# Patient Record
Sex: FEMALE | Race: WHITE | NOT HISPANIC OR LATINO
[De-identification: names, ages, dates, MRNs, and addresses within clinical notes are randomized per-mention and may not be internally consistent; named-entity substitution may affect disease eponyms.]

---

## 2017-03-23 ENCOUNTER — TRANSCRIPTION ENCOUNTER (OUTPATIENT)
Age: 27
End: 2017-03-23

## 2017-03-24 ENCOUNTER — INPATIENT (INPATIENT)
Facility: HOSPITAL | Age: 27
LOS: 0 days | Discharge: HOME | End: 2017-03-24
Attending: SURGERY | Admitting: SURGERY
Payer: COMMERCIAL

## 2017-03-24 VITALS
OXYGEN SATURATION: 100 % | DIASTOLIC BLOOD PRESSURE: 96 MMHG | RESPIRATION RATE: 18 BRPM | HEART RATE: 82 BPM | TEMPERATURE: 98 F | SYSTOLIC BLOOD PRESSURE: 147 MMHG

## 2017-03-24 VITALS
RESPIRATION RATE: 17 BRPM | HEART RATE: 77 BPM | DIASTOLIC BLOOD PRESSURE: 75 MMHG | SYSTOLIC BLOOD PRESSURE: 131 MMHG | OXYGEN SATURATION: 99 %

## 2017-03-24 DIAGNOSIS — K37 UNSPECIFIED APPENDICITIS: ICD-10-CM

## 2017-03-24 LAB
ALBUMIN SERPL ELPH-MCNC: 4 G/DL — SIGNIFICANT CHANGE UP (ref 3.3–5)
ALP SERPL-CCNC: 27 U/L — LOW (ref 40–120)
ALT FLD-CCNC: 12 U/L — SIGNIFICANT CHANGE UP (ref 4–33)
APPEARANCE UR: CLEAR — SIGNIFICANT CHANGE UP
APTT BLD: 31.2 SEC — SIGNIFICANT CHANGE UP (ref 27.5–37.4)
AST SERPL-CCNC: 14 U/L — SIGNIFICANT CHANGE UP (ref 4–32)
BASE EXCESS BLDV CALC-SCNC: -8.2 MMOL/L — SIGNIFICANT CHANGE UP
BASE EXCESS BLDV CALC-SCNC: 0.3 MMOL/L — SIGNIFICANT CHANGE UP
BASOPHILS # BLD AUTO: 0.03 K/UL — SIGNIFICANT CHANGE UP (ref 0–0.2)
BASOPHILS NFR BLD AUTO: 0.6 % — SIGNIFICANT CHANGE UP (ref 0–2)
BILIRUB SERPL-MCNC: 0.2 MG/DL — SIGNIFICANT CHANGE UP (ref 0.2–1.2)
BILIRUB UR-MCNC: NEGATIVE — SIGNIFICANT CHANGE UP
BLD GP AB SCN SERPL QL: NEGATIVE — SIGNIFICANT CHANGE UP
BLOOD GAS VENOUS - CREATININE: 0.87 MG/DL — SIGNIFICANT CHANGE UP (ref 0.5–1.3)
BLOOD GAS VENOUS - CREATININE: SIGNIFICANT CHANGE UP MG/DL (ref 0.5–1.3)
BLOOD UR QL VISUAL: NEGATIVE — SIGNIFICANT CHANGE UP
BUN SERPL-MCNC: 11 MG/DL — SIGNIFICANT CHANGE UP (ref 7–23)
CALCIUM SERPL-MCNC: 9.2 MG/DL — SIGNIFICANT CHANGE UP (ref 8.4–10.5)
CHLORIDE BLDV-SCNC: 108 MMOL/L — SIGNIFICANT CHANGE UP (ref 96–108)
CHLORIDE BLDV-SCNC: SIGNIFICANT CHANGE UP MMOL/L (ref 96–108)
CHLORIDE SERPL-SCNC: 107 MMOL/L — SIGNIFICANT CHANGE UP (ref 98–107)
CO2 SERPL-SCNC: 19 MMOL/L — LOW (ref 22–31)
COLOR SPEC: SIGNIFICANT CHANGE UP
CREAT SERPL-MCNC: 0.76 MG/DL — SIGNIFICANT CHANGE UP (ref 0.5–1.3)
EOSINOPHIL # BLD AUTO: 0.04 K/UL — SIGNIFICANT CHANGE UP (ref 0–0.5)
EOSINOPHIL NFR BLD AUTO: 0.9 % — SIGNIFICANT CHANGE UP (ref 0–6)
GAS PNL BLDV: 112 MMOL/L — CRITICAL LOW (ref 136–146)
GAS PNL BLDV: 137 MMOL/L — SIGNIFICANT CHANGE UP (ref 136–146)
GLUCOSE BLDV-MCNC: 41 — CRITICAL LOW (ref 70–99)
GLUCOSE BLDV-MCNC: 74 — SIGNIFICANT CHANGE UP (ref 70–99)
GLUCOSE SERPL-MCNC: 76 MG/DL — SIGNIFICANT CHANGE UP (ref 70–99)
GLUCOSE UR-MCNC: NEGATIVE — SIGNIFICANT CHANGE UP
HCO3 BLDV-SCNC: 17 MMOL/L — LOW (ref 20–27)
HCO3 BLDV-SCNC: 24 MMOL/L — SIGNIFICANT CHANGE UP (ref 20–27)
HCT VFR BLD CALC: 32.3 % — LOW (ref 34.5–45)
HCT VFR BLDV CALC: 33.3 % — LOW (ref 34.5–45)
HCT VFR BLDV CALC: 34.7 % — SIGNIFICANT CHANGE UP (ref 34.5–45)
HGB BLD-MCNC: 10.9 G/DL — LOW (ref 11.5–15.5)
HGB BLDV-MCNC: 10.8 G/DL — LOW (ref 11.5–15.5)
HGB BLDV-MCNC: 11.2 G/DL — LOW (ref 11.5–15.5)
IMM GRANULOCYTES NFR BLD AUTO: 0 % — SIGNIFICANT CHANGE UP (ref 0–1.5)
INR BLD: 1.08 — SIGNIFICANT CHANGE UP (ref 0.88–1.17)
KETONES UR-MCNC: NEGATIVE — SIGNIFICANT CHANGE UP
LACTATE BLDV-MCNC: 1.2 MMOL/L — SIGNIFICANT CHANGE UP (ref 0.5–2)
LACTATE BLDV-MCNC: 3.8 MMOL/L — HIGH (ref 0.5–2)
LEUKOCYTE ESTERASE UR-ACNC: NEGATIVE — SIGNIFICANT CHANGE UP
LYMPHOCYTES # BLD AUTO: 1.92 K/UL — SIGNIFICANT CHANGE UP (ref 1–3.3)
LYMPHOCYTES # BLD AUTO: 40.9 % — SIGNIFICANT CHANGE UP (ref 13–44)
MCHC RBC-ENTMCNC: 30.4 PG — SIGNIFICANT CHANGE UP (ref 27–34)
MCHC RBC-ENTMCNC: 33.7 % — SIGNIFICANT CHANGE UP (ref 32–36)
MCV RBC AUTO: 90 FL — SIGNIFICANT CHANGE UP (ref 80–100)
MONOCYTES # BLD AUTO: 0.37 K/UL — SIGNIFICANT CHANGE UP (ref 0–0.9)
MONOCYTES NFR BLD AUTO: 7.9 % — SIGNIFICANT CHANGE UP (ref 2–14)
NEUTROPHILS # BLD AUTO: 2.34 K/UL — SIGNIFICANT CHANGE UP (ref 1.8–7.4)
NEUTROPHILS NFR BLD AUTO: 49.7 % — SIGNIFICANT CHANGE UP (ref 43–77)
NITRITE UR-MCNC: NEGATIVE — SIGNIFICANT CHANGE UP
PCO2 BLDV: 48 MMHG — SIGNIFICANT CHANGE UP (ref 41–51)
PCO2 BLDV: 50 MMHG — SIGNIFICANT CHANGE UP (ref 41–51)
PH BLDV: 7.2 PH — LOW (ref 7.32–7.43)
PH BLDV: 7.34 PH — SIGNIFICANT CHANGE UP (ref 7.32–7.43)
PH UR: 6.5 — SIGNIFICANT CHANGE UP (ref 4.6–8)
PLATELET # BLD AUTO: 262 K/UL — SIGNIFICANT CHANGE UP (ref 150–400)
PMV BLD: 11.4 FL — SIGNIFICANT CHANGE UP (ref 7–13)
PO2 BLDV: 36 MMHG — SIGNIFICANT CHANGE UP (ref 35–40)
PO2 BLDV: 44 MMHG — HIGH (ref 35–40)
POTASSIUM BLDV-SCNC: 3.6 MMOL/L — SIGNIFICANT CHANGE UP (ref 3.4–4.5)
POTASSIUM BLDV-SCNC: 8.3 MMOL/L — CRITICAL HIGH (ref 3.4–4.5)
POTASSIUM SERPL-MCNC: 4.1 MMOL/L — SIGNIFICANT CHANGE UP (ref 3.5–5.3)
POTASSIUM SERPL-SCNC: 4.1 MMOL/L — SIGNIFICANT CHANGE UP (ref 3.5–5.3)
PROT SERPL-MCNC: 6.7 G/DL — SIGNIFICANT CHANGE UP (ref 6–8.3)
PROT UR-MCNC: 10 — SIGNIFICANT CHANGE UP
PROTHROM AB SERPL-ACNC: 12.1 SEC — SIGNIFICANT CHANGE UP (ref 9.8–13.1)
RBC # BLD: 3.59 M/UL — LOW (ref 3.8–5.2)
RBC # FLD: 12.6 % — SIGNIFICANT CHANGE UP (ref 10.3–14.5)
RBC CASTS # UR COMP ASSIST: SIGNIFICANT CHANGE UP (ref 0–?)
RH IG SCN BLD-IMP: POSITIVE — SIGNIFICANT CHANGE UP
RH IG SCN BLD-IMP: POSITIVE — SIGNIFICANT CHANGE UP
SAO2 % BLDV: 60.6 % — SIGNIFICANT CHANGE UP (ref 60–85)
SAO2 % BLDV: 78.5 % — SIGNIFICANT CHANGE UP (ref 60–85)
SODIUM SERPL-SCNC: 142 MMOL/L — SIGNIFICANT CHANGE UP (ref 135–145)
SP GR SPEC: > 1.04 — HIGH (ref 1–1.03)
SQUAMOUS # UR AUTO: SIGNIFICANT CHANGE UP
UROBILINOGEN FLD QL: NORMAL E.U. — SIGNIFICANT CHANGE UP (ref 0.1–0.2)
WBC # BLD: 4.7 K/UL — SIGNIFICANT CHANGE UP (ref 3.8–10.5)
WBC # FLD AUTO: 4.7 K/UL — SIGNIFICANT CHANGE UP (ref 3.8–10.5)

## 2017-03-24 PROCEDURE — 88304 TISSUE EXAM BY PATHOLOGIST: CPT | Mod: 26

## 2017-03-24 PROCEDURE — 44970 LAPAROSCOPY APPENDECTOMY: CPT

## 2017-03-24 PROCEDURE — 99223 1ST HOSP IP/OBS HIGH 75: CPT | Mod: 57,AI

## 2017-03-24 RX ORDER — SODIUM CHLORIDE 9 MG/ML
1000 INJECTION INTRAMUSCULAR; INTRAVENOUS; SUBCUTANEOUS ONCE
Qty: 0 | Refills: 0 | Status: COMPLETED | OUTPATIENT
Start: 2017-03-24 | End: 2017-03-24

## 2017-03-24 RX ORDER — PIPERACILLIN AND TAZOBACTAM 4; .5 G/20ML; G/20ML
3.38 INJECTION, POWDER, LYOPHILIZED, FOR SOLUTION INTRAVENOUS ONCE
Qty: 0 | Refills: 0 | Status: COMPLETED | OUTPATIENT
Start: 2017-03-24 | End: 2017-03-24

## 2017-03-24 RX ORDER — MORPHINE SULFATE 50 MG/1
4 CAPSULE, EXTENDED RELEASE ORAL EVERY 4 HOURS
Qty: 0 | Refills: 0 | Status: DISCONTINUED | OUTPATIENT
Start: 2017-03-24 | End: 2017-03-24

## 2017-03-24 RX ORDER — PIPERACILLIN AND TAZOBACTAM 4; .5 G/20ML; G/20ML
3.38 INJECTION, POWDER, LYOPHILIZED, FOR SOLUTION INTRAVENOUS EVERY 8 HOURS
Qty: 0 | Refills: 0 | Status: DISCONTINUED | OUTPATIENT
Start: 2017-03-24 | End: 2017-03-24

## 2017-03-24 RX ORDER — SODIUM CHLORIDE 9 MG/ML
1000 INJECTION, SOLUTION INTRAVENOUS
Qty: 0 | Refills: 0 | Status: DISCONTINUED | OUTPATIENT
Start: 2017-03-24 | End: 2017-03-24

## 2017-03-24 RX ORDER — ENOXAPARIN SODIUM 100 MG/ML
40 INJECTION SUBCUTANEOUS DAILY
Qty: 0 | Refills: 0 | Status: DISCONTINUED | OUTPATIENT
Start: 2017-03-24 | End: 2017-03-24

## 2017-03-24 RX ORDER — ONDANSETRON 8 MG/1
4 TABLET, FILM COATED ORAL ONCE
Qty: 0 | Refills: 0 | Status: DISCONTINUED | OUTPATIENT
Start: 2017-03-24 | End: 2017-03-24

## 2017-03-24 RX ORDER — MORPHINE SULFATE 50 MG/1
2 CAPSULE, EXTENDED RELEASE ORAL EVERY 4 HOURS
Qty: 0 | Refills: 0 | Status: DISCONTINUED | OUTPATIENT
Start: 2017-03-24 | End: 2017-03-24

## 2017-03-24 RX ORDER — HYDROMORPHONE HYDROCHLORIDE 2 MG/ML
0.5 INJECTION INTRAMUSCULAR; INTRAVENOUS; SUBCUTANEOUS
Qty: 0 | Refills: 0 | Status: DISCONTINUED | OUTPATIENT
Start: 2017-03-24 | End: 2017-03-24

## 2017-03-24 RX ORDER — FENTANYL CITRATE 50 UG/ML
25 INJECTION INTRAVENOUS
Qty: 0 | Refills: 0 | Status: DISCONTINUED | OUTPATIENT
Start: 2017-03-24 | End: 2017-03-24

## 2017-03-24 RX ORDER — FENTANYL CITRATE 50 UG/ML
50 INJECTION INTRAVENOUS
Qty: 0 | Refills: 0 | Status: DISCONTINUED | OUTPATIENT
Start: 2017-03-24 | End: 2017-03-24

## 2017-03-24 RX ADMIN — PIPERACILLIN AND TAZOBACTAM 200 GRAM(S): 4; .5 INJECTION, POWDER, LYOPHILIZED, FOR SOLUTION INTRAVENOUS at 18:11

## 2017-03-24 RX ADMIN — PIPERACILLIN AND TAZOBACTAM 200 GRAM(S): 4; .5 INJECTION, POWDER, LYOPHILIZED, FOR SOLUTION INTRAVENOUS at 15:02

## 2017-03-24 RX ADMIN — SODIUM CHLORIDE 1000 MILLILITER(S): 9 INJECTION INTRAMUSCULAR; INTRAVENOUS; SUBCUTANEOUS at 14:50

## 2017-03-24 NOTE — ASU DISCHARGE PLAN (ADULT/PEDIATRIC). - MEDICATION SUMMARY - MEDICATIONS TO TAKE
I will START or STAY ON the medications listed below when I get home from the hospital:    OCP  --     -- Indication: For Birth control    Percocet 5/325 325 mg-5 mg oral tablet  -- 1 tab(s) by mouth every 6 hours, As Needed for apin MDD:12  -- Caution federal law prohibits the transfer of this drug to any person other  than the person for whom it was prescribed.  May cause drowsiness.  Alcohol may intensify this effect.  Use care when operating dangerous machinery.  This prescription cannot be refilled.  This product contains acetaminophen.  Do not use  with any other product containing acetaminophen to prevent possible liver damage.  Using more of this medication than prescribed may cause serious breathing problems.    -- Indication: For As neede for pain

## 2017-03-24 NOTE — H&P ADULT. - ASSESSMENT
26F w/ acute appendicitis  -NPO  -IVF  -Abx  -Booked and consented for lap appy  -Admit to surgery  -D/w attending

## 2017-03-24 NOTE — ED ADULT NURSE NOTE - CHIEF COMPLAINT QUOTE
Patient had abdominal [facility]. Went for CT Scan this morning and was sent in to r/o appendicitis. Has disc with her.

## 2017-03-24 NOTE — ED PROVIDER NOTE - OBJECTIVE STATEMENT
25 y/o female with no significant PMHx presents to the ER c/o RLQ abdominal pain that started yesterday morning.  Pt states pain started periumbilically and moved to the RLQ.  Pt was seen at [facility] urgent care yesterday and was scheduled for a outpatient CT scan today.  Pt was sent to the ER due to finding on CT showing appendicitis.  Pt reports 2 episodes of diarrhea since yesterday.  Pt denies nausea, vomiting, weakness, dizziness, vaginal bleeding, discharge, fevers, chills. 25 y/o female with no significant PMHx presents to the ER c/o RLQ abdominal pain that started yesterday morning.  Pt states pain started periumbilically and moved to the RLQ.  Pt was seen at [facility] urgent care yesterday and was scheduled for a outpatient CT scan today.  Pt was sent to the ER due to finding on CT showing appendicitis.  Pt reports 2 episodes of diarrhea since yesterday.  Pt denies nausea, vomiting, weakness, dizziness, vaginal bleeding, discharge, fevers, chills.  Out patient CT showing: #1 "mild periappendiceal stranding at the tip of the appendix with dilated appendix measuring 7-8mm suggesting mild/early tip appendicitis."  #2 "Splenic cystic lesion measuring 2.4 cm, likely a benign pseudocyst."

## 2017-03-24 NOTE — H&P ADULT. - HISTORY OF PRESENT ILLNESS
26F no surgical history c/o abdominal pain, berta-umbilical migrating to RLQ over 1 day. No fevers/chills/nausea/vomiting/anorexia. Went to urgent care center where she was sent for outpatient CT which was significant for likely early acute appendicitis. No other medical problems.

## 2017-03-24 NOTE — ASU DISCHARGE PLAN (ADULT/PEDIATRIC). - PAIN
prescription given to patient/guardian prescription given by MD/prescription given to patient/guardian

## 2017-03-24 NOTE — ASU DISCHARGE PLAN (ADULT/PEDIATRIC). - NURSING INSTRUCTIONS
Stable for discharge   Do not take pain medication on an empty stomach.  Increase fluids and fiber in diet to prevent constipation.

## 2017-03-24 NOTE — ED PROVIDER NOTE - ATTENDING CONTRIBUTION TO CARE
25 y/o healthy F presents with abd pain.  She reports abd pain since yesterday morning.  Initially periumbilical pain, now migrated to RLQ.  Pt reports associated nausea and decreased appetite.  Last meal was yesterday.  No fever, chills, back pain, cough, dysuria, constipation.  2 episode of soft nonbloody diarrhea.  Pt was seen at urgent care today and had outpatient CT scan showing early appendicitis.  LMP 2 months ago (pt reports menstruat cycle is typically this irregular).  No vaginal bleeding or discharge.  Well appearing, lying comfortably in stretcher, awake and alert, nontoxic.  VSS.  Lungs cta bl.  Cards nl S1/S2, RRR, no MRG.  Abd soft (+)RLQ tenderness at mcburney's point.  No pedal edema or calf tenderness.  No CVA tenderness.  Plan to start IV zosyn, obtain labs, surgery consult, admit.

## 2017-03-24 NOTE — ED PROVIDER NOTE - MEDICAL DECISION MAKING DETAILS
25 y/o Female with no significant PMHx presents to the ER c/o RLQ abdominal pain since yesterday pt was seen and evaluated at urgent care and was sent for at CT this AM pt was dx with appendicitis and sent to the ER, will check labs, upload CT, antibiotics, surgery consult/admit.

## 2017-03-24 NOTE — ED ADULT NURSE NOTE - OBJECTIVE STATEMENT
Pt received in intake spot 2. Alert and oriented x3. Co rlq abdominal pain. Went for CT scan today and sent to ED for ro appendicitis. Labs sent. IV placed.

## 2017-03-24 NOTE — ASU DISCHARGE PLAN (ADULT/PEDIATRIC). - NOTIFY
Persistent Nausea and Vomiting/Inability to Tolerate Liquids or Foods/Fever greater than 101/Pain not relieved by Medications/Bleeding that does not stop

## 2017-03-24 NOTE — ED ADULT TRIAGE NOTE - CHIEF COMPLAINT QUOTE
[facility] Went for [facility] Scan this morning and was sent in to r/o appendicitis. Has disc with her.

## 2017-03-24 NOTE — ASU DISCHARGE PLAN (ADULT/PEDIATRIC). - YOU WERE IN THE HOSPITAL FOR:
Acute appendicitis, s/p laparoscopic appenedectomy Acute appendicitis, s/p laparoscopic appendectomy

## 2017-03-30 LAB — SURGICAL PATHOLOGY STUDY: SIGNIFICANT CHANGE UP

## 2022-02-08 PROBLEM — Z00.00 ENCOUNTER FOR PREVENTIVE HEALTH EXAMINATION: Status: ACTIVE | Noted: 2022-02-08

## 2022-03-24 ENCOUNTER — APPOINTMENT (OUTPATIENT)
Dept: OTOLARYNGOLOGY | Facility: CLINIC | Age: 32
End: 2022-03-24

## 2022-08-24 NOTE — ED PROVIDER NOTE - NS ED MD DISPO DIVISION
What Type Of Note Output Would You Prefer (Optional)?: Standard Output Is This A New Presentation, Or A Follow-Up?: Growths No FRANK

## 2023-07-04 ENCOUNTER — NON-APPOINTMENT (OUTPATIENT)
Age: 33
End: 2023-07-04

## 2023-07-05 ENCOUNTER — APPOINTMENT (OUTPATIENT)
Dept: PAIN MANAGEMENT | Facility: CLINIC | Age: 33
End: 2023-07-05
Payer: COMMERCIAL

## 2023-07-05 VITALS
SYSTOLIC BLOOD PRESSURE: 124 MMHG | HEART RATE: 90 BPM | HEIGHT: 68 IN | BODY MASS INDEX: 24.25 KG/M2 | DIASTOLIC BLOOD PRESSURE: 78 MMHG | WEIGHT: 160 LBS

## 2023-07-05 DIAGNOSIS — Z87.891 PERSONAL HISTORY OF NICOTINE DEPENDENCE: ICD-10-CM

## 2023-07-05 DIAGNOSIS — G43.821 MENSTRUAL MIGRAINE, NOT INTRACTABLE, WITH STATUS MIGRAINOSUS: ICD-10-CM

## 2023-07-05 PROCEDURE — 99204 OFFICE O/P NEW MOD 45 MIN: CPT

## 2023-07-05 RX ORDER — RIZATRIPTAN BENZOATE 10 MG/1
10 TABLET ORAL
Qty: 1 | Refills: 5 | Status: ACTIVE | COMMUNITY
Start: 2023-07-05 | End: 1900-01-01

## 2023-07-07 RX ORDER — UBROGEPANT 100 MG/1
TABLET ORAL
Qty: 1 | Refills: 5 | Status: ACTIVE | COMMUNITY
Start: 2023-07-05 | End: 1900-01-01

## 2023-07-16 NOTE — HISTORY OF PRESENT ILLNESS
[Chronic Headache] : chronic headache [Nausea] : nausea [Photophobia] : photophobia [Phonophobia] : phonophobia [Scalp Tenderness] : scalp tenderness [> 15 days per month] : > 15 days per month [Stable] : The patient reports ~his/her~ symptoms since the last visit are stable [FreeTextEntry1] : Pt has been diagnosed with migraine in 2014.  Notes her migraines throughout her 20s was mostly menstrual.\par Had general tolerated well but had been using triptans prn (always nauseated and left her feeling poorly.)\par Did better during COVID at home.\par When she went back to work in 2021 in person she was getting nausea, dizziness and neck pain and pain over right eye.  This also worsened anxiety and headaches.  \rosi Was put onto Trintellix, had been on xanax in past.\par Was seen by new neurologist who recommended Nurtec qoday.\par however, she was getting headaches on days off.\par Was diagnosed with PPPD.  Had found inner ear deficit and was doing some vestibular therapy as well.\par Now when she is sick will get trigger, sleep, food triggers.\par Last year she had tried to come off of OCP and felt constantly dizzy and nauseated.\par Pain starts in front.  Gets light and sound sensitivity.  Worsens with movement.  Feel "like dying."  With lower grade also gets nauseated.\par + neck pain and has seen MD in June and had neck MRI with mild bulging.\par Occasional jaw pain.\par No other issues with snoring.  No significant change in weight.\par \par Nurtec is partially effective but doesn't get rid of pain.\par Has seen Dwight Rosenstein and other neurologist in past.\par

## 2023-07-16 NOTE — REVIEW OF SYSTEMS
[Feeling Poorly] : feeling poorly [Eye Pain] : eye pain [Arthralgias] : arthralgias [Sleep Disturbances] : sleep disturbances [Fever] : no fever [Feeling Tired] : not feeling tired [Eyesight Problems] : no eyesight problems [Nasal Discharge] : no nasal discharge [Chest Pain] : no chest pain [Cough] : no cough [Constipation] : no constipation [Lower Back Pain] : no lower back pain [Skin Lesions] : no skin lesions [Itching] : no itching [Fainting] : no fainting [Muscle Weakness] : no muscle weakness [Swollen Glands] : no swollen glands

## 2023-07-16 NOTE — PHYSICAL EXAM
[General Appearance - Alert] : alert [General Appearance - Well Developed] : well developed [Oriented To Time, Place, And Person] : oriented to person, place, and time [Impaired Insight] : insight and judgment were intact [Affect] : the affect was normal [Memory Recent] : recent memory was not impaired [Memory Remote] : remote memory was not impaired [Person] : oriented to person [Place] : oriented to place [Time] : oriented to time [Short Term Intact] : short term memory intact [Remote Intact] : remote memory intact [Registration Intact] : recent registration memory intact [Concentration Intact] : normal concentrating ability [Visual Intact] : visual attention was ~T not ~L decreased [Fluency] : fluency intact [Comprehension] : comprehension intact [Current Events] : adequate knowledge of current events [Past History] : adequate knowledge of personal past history [Vocabulary] : adequate range of vocabulary [Cranial Nerves Oculomotor (III)] : extraocular motion intact [Cranial Nerves Facial (VII)] : face symmetrical [Cranial Nerves Vestibulocochlear (VIII)] : hearing was intact bilaterally [Cranial Nerves Accessory (XI - Cranial And Spinal)] : head turning and shoulder shrug symmetric [Cranial Nerves Hypoglossal (XII)] : there was no tongue deviation with protrusion [No Muscle Atrophy] : normal bulk in all four extremities [Motor Strength Upper Extremities Bilaterally] : strength was normal in both upper extremities [Sensation Tactile Decrease] : light touch was intact [Allodynia] : no ~T allodynia present [Sclera] : the sclera and conjunctiva were normal [No RICKEY] : no internuclear ophthalmoplegia [Strabismus] : no strabismus was seen [Neck Appearance] : the appearance of the neck was normal [Neck Cervical Mass (___cm)] : no neck mass was observed [Exaggerated Use Of Accessory Muscles For Inspiration] : no accessory muscle use [Abnormal Walk] : normal gait [Nail Clubbing] : no clubbing  or cyanosis of the fingernails [Involuntary Movements] : no involuntary movements were seen [Skin Color & Pigmentation] : normal skin color and pigmentation [] : no rash

## 2023-07-16 NOTE — ASSESSMENT
[FreeTextEntry1] : General headache hygiene discussed.\par referral to [facility] and [facility] sites\par trial of rizatriptan\par trial of prn ubrelvy\par consider use of prevention with vitamin.\par

## 2023-09-25 ENCOUNTER — APPOINTMENT (OUTPATIENT)
Dept: PAIN MANAGEMENT | Facility: CLINIC | Age: 33
End: 2023-09-25
Payer: COMMERCIAL

## 2023-09-25 VITALS
HEART RATE: 89 BPM | DIASTOLIC BLOOD PRESSURE: 82 MMHG | HEIGHT: 68 IN | WEIGHT: 165 LBS | BODY MASS INDEX: 25.01 KG/M2 | SYSTOLIC BLOOD PRESSURE: 120 MMHG

## 2023-09-25 PROCEDURE — 99213 OFFICE O/P EST LOW 20 MIN: CPT

## 2023-12-28 ENCOUNTER — APPOINTMENT (OUTPATIENT)
Dept: PAIN MANAGEMENT | Facility: CLINIC | Age: 33
End: 2023-12-28
Payer: COMMERCIAL

## 2023-12-28 VITALS
HEART RATE: 83 BPM | HEIGHT: 68 IN | SYSTOLIC BLOOD PRESSURE: 184 MMHG | WEIGHT: 165 LBS | BODY MASS INDEX: 25.01 KG/M2 | DIASTOLIC BLOOD PRESSURE: 88 MMHG

## 2023-12-28 VITALS — SYSTOLIC BLOOD PRESSURE: 127 MMHG | DIASTOLIC BLOOD PRESSURE: 86 MMHG

## 2023-12-28 PROCEDURE — 99213 OFFICE O/P EST LOW 20 MIN: CPT

## 2023-12-28 RX ORDER — VORTIOXETINE 10 MG/1
10 TABLET, FILM COATED ORAL
Refills: 0 | Status: ACTIVE | COMMUNITY
Start: 2023-12-28

## 2023-12-28 NOTE — HISTORY OF PRESENT ILLNESS
[FreeTextEntry1] : Pt returns today for a follow up appt for migraine .  Reports in November and December had an increase in migraine frequency. Has an increase in anxiety related to migraine symptoms. . Overall health has been good.  Discussed with patient starting a migraine prevention . Pt is agreeable.  [Chronic Headache] : chronic headache [Nausea] : nausea [Photophobia] : photophobia [Phonophobia] : phonophobia [Scalp Tenderness] : scalp tenderness [> 15 days per month] : > 15 days per month [Stable] : The patient reports ~his/her~ symptoms since the last visit are stable

## 2023-12-28 NOTE — REVIEW OF SYSTEMS
[Fever] : no fever [Feeling Poorly] : feeling poorly [Feeling Tired] : not feeling tired [Eye Pain] : eye pain [Eyesight Problems] : no eyesight problems [Nasal Discharge] : no nasal discharge [Chest Pain] : no chest pain [Cough] : no cough [Constipation] : no constipation [Arthralgias] : arthralgias [Lower Back Pain] : no lower back pain [Skin Lesions] : no skin lesions [Itching] : no itching [Fainting] : no fainting [Sleep Disturbances] : sleep disturbances [Muscle Weakness] : no muscle weakness [Swollen Glands] : no swollen glands

## 2023-12-28 NOTE — PHYSICAL EXAM
[General Appearance - Alert] : alert [General Appearance - Well Developed] : well developed [Oriented To Time, Place, And Person] : oriented to person, place, and time [Impaired Insight] : insight and judgment were intact [Affect] : the affect was normal [Memory Recent] : recent memory was not impaired [Memory Remote] : remote memory was not impaired [Person] : oriented to person [Place] : oriented to place [Time] : oriented to time [Short Term Intact] : short term memory intact [Remote Intact] : remote memory intact [Registration Intact] : recent registration memory intact [Concentration Intact] : normal concentrating ability [Visual Intact] : visual attention was ~T not ~L decreased [Fluency] : fluency intact [Comprehension] : comprehension intact [Current Events] : adequate knowledge of current events [Past History] : adequate knowledge of personal past history [Vocabulary] : adequate range of vocabulary [Cranial Nerves Oculomotor (III)] : extraocular motion intact [Cranial Nerves Facial (VII)] : face symmetrical [Cranial Nerves Accessory (XI - Cranial And Spinal)] : head turning and shoulder shrug symmetric [Cranial Nerves Vestibulocochlear (VIII)] : hearing was intact bilaterally [Cranial Nerves Hypoglossal (XII)] : there was no tongue deviation with protrusion [No Muscle Atrophy] : normal bulk in all four extremities [Motor Strength Upper Extremities Bilaterally] : strength was normal in both upper extremities [Sensation Tactile Decrease] : light touch was intact [Allodynia] : no ~T allodynia present [Sclera] : the sclera and conjunctiva were normal [No RICKEY] : no internuclear ophthalmoplegia [Strabismus] : no strabismus was seen [Neck Cervical Mass (___cm)] : no neck mass was observed [Neck Appearance] : the appearance of the neck was normal [Exaggerated Use Of Accessory Muscles For Inspiration] : no accessory muscle use [Abnormal Walk] : normal gait [Nail Clubbing] : no clubbing  or cyanosis of the fingernails [Involuntary Movements] : no involuntary movements were seen [Skin Color & Pigmentation] : normal skin color and pigmentation [] : no rash

## 2023-12-28 NOTE — ASSESSMENT
[FreeTextEntry1] : Chronic migraine   trial of Atacand to prevent migraine. Discussed potential adverse effects.  RTO 8 weeks

## 2024-01-12 ENCOUNTER — TRANSCRIPTION ENCOUNTER (OUTPATIENT)
Age: 34
End: 2024-01-12

## 2024-01-16 ENCOUNTER — TRANSCRIPTION ENCOUNTER (OUTPATIENT)
Age: 34
End: 2024-01-16

## 2024-02-23 ENCOUNTER — APPOINTMENT (OUTPATIENT)
Dept: PAIN MANAGEMENT | Facility: CLINIC | Age: 34
End: 2024-02-23
Payer: COMMERCIAL

## 2024-02-23 VITALS
HEIGHT: 68 IN | SYSTOLIC BLOOD PRESSURE: 129 MMHG | DIASTOLIC BLOOD PRESSURE: 85 MMHG | WEIGHT: 165 LBS | BODY MASS INDEX: 25.01 KG/M2 | HEART RATE: 82 BPM

## 2024-02-23 PROCEDURE — 99213 OFFICE O/P EST LOW 20 MIN: CPT

## 2024-02-23 NOTE — HISTORY OF PRESENT ILLNESS
[Headache] : headache [Aura] : aura [Dizziness] : dizziness [Nausea] : nausea [Vomiting] : Vomiting [Photophobia] : photophobia [Phonophobia] : phonophobia [Neck Pain] : neck pain [Tingling] : tingling [Scalp Tenderness] : scalp tenderness [___ Times Per Week] : [unfilled] times each week [FreeTextEntry1] : Pt taking Atacand 4mg / day - tried to increase to 8mg / day but felt light headed.  Has noticed a slight  decrease. in migraine frequency but still having 2 migraine / week . Stress is a trigger and patient is anticipating increase in stress with work and planning wedding in June.  Ubrelvy is partially helpful.    Denies any new migraine symptoms.  Discussed trial o f Tnig.Advised to avoid pregnancy and must be off of it for 6 months before trying to conceive.

## 2024-02-23 NOTE — PHYSICAL EXAM
[General Appearance - Alert] : alert [General Appearance - In No Acute Distress] : in no acute distress [General Appearance - Well Nourished] : well nourished [General Appearance - Well Developed] : well developed [General Appearance - Well-Appearing] : healthy appearing [Oriented To Time, Place, And Person] : oriented to person, place, and time [Affect] : the affect was normal [Mood] : the mood was normal [Memory Recent] : recent memory was not impaired [Memory Remote] : remote memory was not impaired [Cranial Nerves Hypoglossal (XII)] : there was no tongue deviation with protrusion [Cranial Nerves Accessory (XI - Cranial And Spinal)] : head turning and shoulder shrug symmetric [Cranial Nerves Facial (VII)] : face symmetrical [Motor Strength] : muscle strength was normal in all four extremities [Paresis Pronator Drift Right-Sided] : no pronator drift on the right [Paresis Pronator Drift Left-Sided] : no pronator drift on the left [Motor Strength Upper Extremities Bilaterally] : strength was normal in both upper extremities [Coordination - Dysmetria Impaired Finger-to-Nose Bilateral] : not present [Motor Strength Lower Extremities Bilaterally] : strength was normal in both lower extremities [Sclera] : the sclera and conjunctiva were normal [PERRL With Normal Accommodation] : pupils were equal in size, round, reactive to light, with normal accommodation [Extraocular Movements] : extraocular movements were intact [] : no respiratory distress [Edema] : there was no peripheral edema

## 2024-02-23 NOTE — REVIEW OF SYSTEMS
[Fever] : no fever [Chest Pain] : no chest pain [Chills] : no chills [Palpitations] : no palpitations

## 2024-02-29 RX ORDER — FREMANEZUMAB-VFRM 225 MG/1.5ML
225 INJECTION SUBCUTANEOUS
Qty: 1 | Refills: 4 | Status: ACTIVE | COMMUNITY
Start: 2024-02-23 | End: 1900-01-01

## 2024-03-28 ENCOUNTER — TRANSCRIPTION ENCOUNTER (OUTPATIENT)
Age: 34
End: 2024-03-28

## 2024-04-18 ENCOUNTER — NON-APPOINTMENT (OUTPATIENT)
Age: 34
End: 2024-04-18

## 2024-04-22 ENCOUNTER — NON-APPOINTMENT (OUTPATIENT)
Age: 34
End: 2024-04-22

## 2024-04-23 ENCOUNTER — APPOINTMENT (OUTPATIENT)
Dept: PAIN MANAGEMENT | Facility: CLINIC | Age: 34
End: 2024-04-23
Payer: COMMERCIAL

## 2024-04-23 VITALS
SYSTOLIC BLOOD PRESSURE: 130 MMHG | HEART RATE: 80 BPM | WEIGHT: 165 LBS | BODY MASS INDEX: 25.01 KG/M2 | DIASTOLIC BLOOD PRESSURE: 87 MMHG | HEIGHT: 68 IN

## 2024-04-23 PROCEDURE — 99213 OFFICE O/P EST LOW 20 MIN: CPT

## 2024-04-23 RX ORDER — MIRTAZAPINE 7.5 MG/1
7.5 TABLET, FILM COATED ORAL
Refills: 0 | Status: ACTIVE | COMMUNITY

## 2024-04-23 NOTE — HISTORY OF PRESENT ILLNESS
[FreeTextEntry1] : Pt returns today for a follow up appt.  Took 1 injection of Ajovy - it was helpful for migraine prevention but patient felt it caused palpitations and anxiety.  Denies any new migraine symptoms  Ubrelvy is helpful to abort migraine.  [Chronic Headache] : chronic headache [Nausea] : nausea [Photophobia] : photophobia [Phonophobia] : phonophobia [Scalp Tenderness] : scalp tenderness [> 15 days per month] : > 15 days per month [Stable] : The patient reports ~his/her~ symptoms since the last visit are stable

## 2024-06-18 ENCOUNTER — RX RENEWAL (OUTPATIENT)
Age: 34
End: 2024-06-18

## 2024-06-18 RX ORDER — CANDESARTAN CILEXETIL 4 MG/1
4 TABLET ORAL
Qty: 60 | Refills: 2 | Status: ACTIVE | COMMUNITY
Start: 2023-12-28 | End: 1900-01-01